# Patient Record
Sex: FEMALE | Race: WHITE
[De-identification: names, ages, dates, MRNs, and addresses within clinical notes are randomized per-mention and may not be internally consistent; named-entity substitution may affect disease eponyms.]

---

## 2017-04-19 ENCOUNTER — HOSPITAL ENCOUNTER (OUTPATIENT)
Dept: HOSPITAL 45 - C.LABBC | Age: 77
Discharge: HOME | End: 2017-04-19
Attending: PHYSICIAN ASSISTANT
Payer: COMMERCIAL

## 2017-04-19 DIAGNOSIS — G50.0: Primary | ICD-10-CM

## 2017-04-19 LAB
BUN SERPL-MCNC: 20 MG/DL (ref 7–18)
CREAT SERPL-MCNC: 0.91 MG/DL (ref 0.6–1.2)

## 2017-04-21 ENCOUNTER — HOSPITAL ENCOUNTER (OUTPATIENT)
Dept: HOSPITAL 45 - C.MRIBC | Age: 77
Discharge: HOME | End: 2017-04-21
Attending: PHYSICIAN ASSISTANT
Payer: COMMERCIAL

## 2017-04-21 DIAGNOSIS — G50.0: Primary | ICD-10-CM

## 2017-04-21 NOTE — DIAGNOSTIC IMAGING REPORT
MRI OF THE BRAIN COMBO TRIGEMINAL NERVE PROTOCOL



CLINICAL HISTORY: Left-sided trigeminal neuralgia.



COMPARISON STUDY: MRI of the brain dated 11/26/2015 and 6/17/2015.



TECHNIQUE: MRI of the brain was performed utilizing various T1 and T2-weighted

sequences in the axial, sagittal, and coronal planes. Contrast-enhanced

sequences were acquired following the administration of 6 cc of Gadavist.

Additional high-resolution imaging was performed through the skull base both pre

and postcontrast for further assessment of the trigeminal nerve.



FINDINGS:



Brain parenchyma: Right frontal encephalomalacia is unchanged and likely related

to previous surgery. Mild dural thickening and enhancement along the right

frontal convexity in the midline falx is similar to previous and likely a

postoperative basis. There are age-related involutional changes noting minimal

subcortical and periventricular PICC radiopathic disease. There is no hemorrhage

or mass effect. There is no restricted diffusion to suggest acute ischemia. No

enhancing mass lesion is identified on the postcontrast images. Gray-white

matter differentiation is preserved. No extra-axial fluid collection is seen.

The cerebellar tonsils are normal in configuration.



Ventricles, sulci, and cisterns: Prominent secondary to involutional change.



Trigeminal nerves: The trigeminal nerves are symmetric bilaterally, and are

normal in both morphology and signal intensity. No abnormal enhancement is

identified involving the trigeminal nerves on the postcontrast imaging.



Pituitary and sella: Unremarkable.



Intracranial vasculature: Normal flow voids are maintained at the skull base.



Orbits: The bony orbits are grossly intact. Orbital contents are normal in

appearance noting a left ocular lens implant.



Sinuses and mastoids: Clear.



Calvarium: Again seen are postoperative changes from right frontoparietal

craniotomy. No destructive calvarial lesion is seen.



Cervical cord: Partially visualized cervical spinal cord is normal in morphology

and signal intensity.





IMPRESSION: 



1. No acute intracranial abnormality.



2. Mild age-related and postoperative change as above. This is similar in

appearance to prior examinations.



3. Unremarkable MRI assessment of the trigeminal nerves.







Electronically signed by:  Kd Avery M.D.

4/21/2017 11:12 AM



Dictated Date/Time:  4/21/2017 11:02 AM

## 2017-07-10 ENCOUNTER — HOSPITAL ENCOUNTER (EMERGENCY)
Dept: HOSPITAL 45 - C.EDB | Age: 77
Discharge: HOME | End: 2017-07-10
Payer: COMMERCIAL

## 2017-07-10 VITALS
BODY MASS INDEX: 20.87 KG/M2 | WEIGHT: 129.85 LBS | WEIGHT: 129.85 LBS | BODY MASS INDEX: 20.87 KG/M2 | HEIGHT: 65.98 IN | HEIGHT: 65.98 IN

## 2017-07-10 VITALS — SYSTOLIC BLOOD PRESSURE: 148 MMHG | HEART RATE: 55 BPM | OXYGEN SATURATION: 97 % | DIASTOLIC BLOOD PRESSURE: 50 MMHG

## 2017-07-10 VITALS — TEMPERATURE: 98.42 F | OXYGEN SATURATION: 97 %

## 2017-07-10 DIAGNOSIS — Z91.010: ICD-10-CM

## 2017-07-10 DIAGNOSIS — K57.92: ICD-10-CM

## 2017-07-10 DIAGNOSIS — Z88.8: ICD-10-CM

## 2017-07-10 DIAGNOSIS — R19.7: ICD-10-CM

## 2017-07-10 DIAGNOSIS — Z96.649: ICD-10-CM

## 2017-07-10 DIAGNOSIS — Z83.79: ICD-10-CM

## 2017-07-10 DIAGNOSIS — R10.33: Primary | ICD-10-CM

## 2017-07-10 DIAGNOSIS — Z90.49: ICD-10-CM

## 2017-07-10 DIAGNOSIS — M16.10: ICD-10-CM

## 2017-07-10 DIAGNOSIS — Z86.018: ICD-10-CM

## 2017-07-10 DIAGNOSIS — R11.2: ICD-10-CM

## 2017-07-10 DIAGNOSIS — Z88.5: ICD-10-CM

## 2017-07-10 DIAGNOSIS — Z79.899: ICD-10-CM

## 2017-07-10 LAB
ALP SERPL-CCNC: 91 U/L (ref 45–117)
ALT SERPL-CCNC: 21 U/L (ref 12–78)
ANION GAP SERPL CALC-SCNC: 11 MMOL/L (ref 3–11)
APPEARANCE UR: CLEAR
AST SERPL-CCNC: 15 U/L (ref 15–37)
BASOPHILS # BLD: 0.02 K/UL (ref 0–0.2)
BASOPHILS NFR BLD: 0.2 %
BILIRUB UR-MCNC: (no result) MG/DL
BUN SERPL-MCNC: 25 MG/DL (ref 7–18)
BUN/CREAT SERPL: 26.9 (ref 10–20)
CALCIUM SERPL-MCNC: 10.5 MG/DL (ref 8.5–10.1)
CHLORIDE SERPL-SCNC: 107 MMOL/L (ref 98–107)
CO2 SERPL-SCNC: 24 MMOL/L (ref 21–32)
COLOR UR: YELLOW
COMPLETE: YES
CREAT CL PREDICTED SERPL C-G-VRATE: 46.6 ML/MIN
CREAT SERPL-MCNC: 0.94 MG/DL (ref 0.6–1.2)
EOSINOPHIL NFR BLD AUTO: 287 K/UL (ref 130–400)
GLUCOSE SERPL-MCNC: 110 MG/DL (ref 70–99)
HCT VFR BLD CALC: 42.3 % (ref 37–47)
IG%: 0.1 %
IMM GRANULOCYTES NFR BLD AUTO: 26.7 %
LYMPHOCYTES # BLD: 2.14 K/UL (ref 1.2–3.4)
MANUAL MICROSCOPIC REQUIRED?: NO
MCH RBC QN AUTO: 35 PG (ref 25–34)
MCHC RBC AUTO-ENTMCNC: 35.2 G/DL (ref 32–36)
MCV RBC AUTO: 99.3 FL (ref 80–100)
MONOCYTES NFR BLD: 9.6 %
NEUTROPHILS # BLD AUTO: 2.2 %
NEUTROPHILS NFR BLD AUTO: 61.2 %
NITRITE UR QL STRIP: (no result)
PH UR STRIP: 6 [PH] (ref 4.5–7.5)
PMV BLD AUTO: 10.8 FL (ref 7.4–10.4)
POTASSIUM SERPL-SCNC: 3.9 MMOL/L (ref 3.5–5.1)
RBC # BLD AUTO: 4.26 M/UL (ref 4.2–5.4)
REVIEW REQ?: NO
SODIUM SERPL-SCNC: 142 MMOL/L (ref 136–145)
SP GR UR STRIP: 1.02 (ref 1–1.03)
URINE EPITHELIAL CELL AUTO: (no result) /LPF (ref 0–5)
UROBILINOGEN UR-MCNC: (no result) MG/DL
WBC # BLD AUTO: 8.01 K/UL (ref 4.8–10.8)
ZZUR CULT IF INDIC CLEAN CATCH: NO

## 2017-07-10 NOTE — EMERGENCY ROOM VISIT NOTE
History


Report prepared by Gosia:  Marysol Timmons


Under the Supervision of:  Dr. Maximiliano Marquez D.O.


First contact with patient:  09:26


Chief Complaint:  NAUSEA


Stated Complaint:  FLU LIKE SX


Nursing Triage Summary:  


Patient having nausea, vomiting, diarrhea and on/off fevers. 











PMH: Trigeminal Neuralgia (right side), Bell's Palsy (left side), S/P Choley, 


diverticulitis.





History of Present Illness


The patient is a 77 year old female who presents to the Emergency Room with 

complaints of worsening abdominal pain beginning last night. She states that 

she is also experiencing nausea, vomiting and diarrhea. She states a couple of 

episodes of diarrhea.The patient notes a decrease in appetite. She has a 

history of diverticulitis. The patient takes Lorazepam and states that she took 

her dosage yesterday and saw improvement in her symptoms. She notes trigeminal 

neuralgia on her right side. Pt denies headache, change in vision, fevers, 

chest pain, shortness of breath, pain with urination, blood in stool, recent 

exposure to illness, weakness or numbness in one extremity and melena.  He 

admits to persistent Bell's palsy over the past 3 years on the left side of her 

face.





   Source of History:  patient


   Onset:  last night


   Position:  abdomen


   Timing:  worsening


   Associated Symptoms:  + nausea, + vomiting, + urinary symptoms





Review of Systems


See HPI for pertinent positives & negatives. A total of 10 systems reviewed and 

were otherwise negative.





Past Medical & Surgical


Medical Problems:


(1) CLAUDIO (acute kidney injury)


(2) Bell's palsy


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


(4) Diverticulitis


(5) Hypotension


(6) Meningioma


(7) Volume depletion


Surgical Problems:


(1) History of hip replacement


(2) S/P cholecystectomy








Family History





Appendicitis


FH: colonic diverticulitis





Social History


Smoking Status:  Never Smoker


Alcohol Use:  occasionally


Drug Use:  none


Marital Status:  


Housing Status:  lives with significant other


Occupation Status:  retired





Current/Historical Medications


Scheduled


Baclofen (Lioresal), 20 MG PO BID


Lorazepam (Ativan), 0.5 MG PO DAILY


Ondansetron Hcl (Zofran), 4 MG PO TID





Allergies


Coded Allergies:  


     Hydrocodone (Verified  Allergy, Intermediate, ITCHING, 7/10/17)


     Codeine (Verified  Allergy, Mild, RASH, 7/10/17)


     Phenytoin (Verified  Allergy, Mild, VERTIGO, 7/10/17)


     Peanut (Verified  Allergy, Unknown, SHORTNESS OF BREATH, 7/10/17)


     Hydromorphone (Verified  Adverse Reaction, Intermediate, NAUSEA/ VOMITING, 

7/10/17)


     Oxycodone (Verified  Adverse Reaction, Mild, NAUSEA/VERTIGO, 7/10/17)





Physical Exam


Vital Signs











  Date Time  Temp Pulse Resp B/P (MAP) Pulse Ox O2 Delivery O2 Flow Rate FiO2


 


7/10/17 11:28  55 18 148/50 97 Room Air  


 


7/10/17 09:26  58      


 


7/10/17 09:22 36.9 77 18 158/88 97 Room Air  


 


7/10/17 09:22     97 Room Air  











Physical Exam


GENERAL: laying in bed, disheveled, alert, well appearing, well nourished, no 

distress, non-toxic 


EYE EXAM: normal conjunctiva, PERRL and EOM's intact


OROPHARYNX: no exudate, no erythema, lips, buccal mucosa, and tongue normal and 

mucous membranes are moist


NECK: supple, no nuchal rigidity, no adenopathy, non-tender


LUNGS: Clear to auscultation. Normal chest wall mechanics


HEART: no murmurs, S1 normal and S2 normal 


ABDOMEN: abdomen soft, non-tender, normo-active bowel sounds, no masses, no 

rebound or guarding. 


BACK: Back is symmetrical on inspection and there is no deformity, no midline 

tenderness, no CVA tenderness. 


SKIN: no rashes and no bruising 


UPPER EXTREMITIES: upper extremities are grossly normal. 


LOWER EXTREMITIES: No pitting edema.


NEURO EXAM: Normal sensorium, cranial nerves II-XII normal with exception of 

left facial droop x3 years, normal speech,  no weakness of arms, no weakness of 

legs.





Medical Decision & Procedures


ER Provider


Diagnostic Interpretation:


CT result as stated below per my review and the radiologist's interpretation: 





ABD/PELVIS IV CONTRAST ONLY





HISTORY:77 yearsFemalelower abd/priumbilical pain  





COMPARISON: CT abdomen and pelvis 11/28/2016 and 1/26/2007.





TECHNIQUE: Multiple axial CT images of the abdomen and pelvis were obtained


following the intravenous administration of 93 mL Optiray 320.





FINDINGS: 


There is minimal dependent bibasilar atelectasis. No gross pneumoperitoneum.


Inferior cardiac chambers are unremarkable.





Circumscribed low attenuating lesion of the posterior right hepatic lobe, 1.5 x


1.3 cm is nonspecific and suggests a hepatic cyst. 1.7 x 1.0 cm low attenuating


lesion of the mid spleen also appears unchanged from multiple comparisons dating


back to 1/26/2007 suggesting benign cyst. Prior cholecystectomy. There is


moderate pancreatic atrophy. Adrenal glands appear unremarkable.





5 mm low attenuating lesion of the interpolar left kidney is too small to


characterize however suggests cyst with similar appearing lesions seen on the


right. No hydronephrosis. The urinary bladder is unremarkable. Uterus and adnexa


are age-appropriate.





There is moderate atherosclerotic plaquing of the abdominal aorta and its


branches. No pathologic adenopathy identified. There is a small to


moderate-sized hiatal hernia with partially intrathoracic stomach. No bowel


obstruction. Noninflamed colonic diverticula are seen. Normal appendix. Note is


made of diastases recti with a small periumbilical fat filled hernia with


diastases of 1.2 cm. Left gluteal injection granulomas present. 





Right hip arthroplasty noted. There is moderate osteoarthritis of the left hip.


7 mm retrolisthesis L2 on L3 and 6 mm retrolisthesis L1 on L2 are again noted


with approximately 30% anterior endplate compression deformity of L1, unchanged


findings.





IMPRESSION: 


1. No acute intra-abdominal or intrapelvic abnormality identified.


2. Diastases recti with small fat filled periumbilical hernia.


3. Colonic diverticulosis without diverticulitis.


4.  Small-to-moderate sized hiatal hernia.


5. Prior cholecystectomy.








The above report was generated using voice recognition software. It may contain


grammatical, syntax or spelling errors.





Electronically signed by:  Mingo Mendez


7/10/2017 11:16 AM





Dictated Date/Time:  7/10/2017 11:05 AM





Laboratory Results


7/10/17 09:25








Red Blood Count 4.26, Mean Corpuscular Volume 99.3, Mean Corpuscular Hemoglobin 

35.0, Mean Corpuscular Hemoglobin Concent 35.2, Mean Platelet Volume 10.8, 

Neutrophils (%) (Auto) 61.2, Lymphocytes (%) (Auto) 26.7, Monocytes (%) (Auto) 

9.6, Eosinophils (%) (Auto) 2.2, Basophils (%) (Auto) 0.2, Neutrophils # (Auto) 

4.89, Lymphocytes # (Auto) 2.14, Monocytes # (Auto) 0.77, Eosinophils # (Auto) 

0.18, Basophils # (Auto) 0.02





7/10/17 09:25

















Test


  7/10/17


09:25 7/10/17


11:25


 


White Blood Count


  8.01 K/uL


(4.8-10.8) 


 


 


Red Blood Count


  4.26 M/uL


(4.2-5.4) 


 


 


Hemoglobin


  14.9 g/dL


(12.0-16.0) 


 


 


Hematocrit 42.3 % (37-47)  


 


Mean Corpuscular Volume


  99.3 fL


() 


 


 


Mean Corpuscular Hemoglobin


  35.0 pg


(25-34) 


 


 


Mean Corpuscular Hemoglobin


Concent 35.2 g/dl


(32-36) 


 


 


Platelet Count


  287 K/uL


(130-400) 


 


 


Mean Platelet Volume


  10.8 fL


(7.4-10.4) 


 


 


Neutrophils (%) (Auto) 61.2 %  


 


Lymphocytes (%) (Auto) 26.7 %  


 


Monocytes (%) (Auto) 9.6 %  


 


Eosinophils (%) (Auto) 2.2 %  


 


Basophils (%) (Auto) 0.2 %  


 


Neutrophils # (Auto)


  4.89 K/uL


(1.4-6.5) 


 


 


Lymphocytes # (Auto)


  2.14 K/uL


(1.2-3.4) 


 


 


Monocytes # (Auto)


  0.77 K/uL


(0.11-0.59) 


 


 


Eosinophils # (Auto)


  0.18 K/uL


(0-0.5) 


 


 


Basophils # (Auto)


  0.02 K/uL


(0-0.2) 


 


 


RDW Standard Deviation


  48.2 fL


(36.4-46.3) 


 


 


RDW Coefficient of Variation


  13.4 %


(11.5-14.5) 


 


 


Immature Granulocyte % (Auto) 0.1 %  


 


Immature Granulocyte # (Auto)


  0.01 K/uL


(0.00-0.02) 


 


 


Anion Gap


  11.0 mmol/L


(3-11) 


 


 


Est Creatinine Clear Calc


Drug Dose 46.6 ml/min 


  


 


 


Estimated GFR (


American) 67.8 


  


 


 


Estimated GFR (Non-


American 58.5 


  


 


 


BUN/Creatinine Ratio 26.9 (10-20)  


 


Calcium Level


  10.5 mg/dl


(8.5-10.1) 


 


 


Total Bilirubin


  0.6 mg/dl


(0.2-1) 


 


 


Direct Bilirubin


  0.2 mg/dl


(0-0.2) 


 


 


Aspartate Amino Transf


(AST/SGOT) 15 U/L (15-37) 


  


 


 


Alanine Aminotransferase


(ALT/SGPT) 21 U/L (12-78) 


  


 


 


Alkaline Phosphatase


  91 U/L


() 


 


 


Total Protein


  7.8 gm/dl


(6.4-8.2) 


 


 


Albumin


  4.5 gm/dl


(3.4-5.0) 


 


 


Lipase


  189 U/L


() 


 


 


Urine Color  YELLOW 


 


Urine Appearance  CLEAR (CLEAR) 


 


Urine pH  6.0 (4.5-7.5) 


 


Urine Specific Gravity


  


  1.019


(1.000-1.030)


 


Urine Protein  NEG (NEG) 


 


Urine Glucose (UA)  NEG (NEG) 


 


Urine Ketones  NEG (NEG) 


 


Urine Occult Blood  NEG (NEG) 


 


Urine Nitrite  NEG (NEG) 


 


Urine Bilirubin  NEG (NEG) 


 


Urine Urobilinogen  NEG (NEG) 


 


Urine Leukocyte Esterase  NEG (NEG) 


 


Urine WBC (Auto)  1-5 /hpf (0-5) 


 


Urine RBC (Auto)  0-4 /hpf (0-4) 


 


Urine Hyaline Casts (Auto)  0 /lpf (0-5) 


 


Urine Epithelial Cells (Auto)  0-5 /lpf (0-5) 


 


Urine Bacteria (Auto)  NEG (NEG) 





Laboratory results per my review.





Medications Administered











 Medications


  (Trade)  Dose


 Ordered  Sig/Nola


 Route  Start Time


 Stop Time Status Last Admin


Dose Admin


 


 Sodium Chloride  1,000 ml @ 


 999 mls/hr  Q1H1M STAT


 IV  7/10/17 09:32


 7/10/17 10:32 DC 7/10/17 09:52


999 MLS/HR


 


 Ondansetron HCl


  (Zofran Inj)  4 mg  NOW  STAT


 IV  7/10/17 09:32


 7/10/17 09:34 DC 7/10/17 09:52


4 MG











ECG


Indication:  abdominal pain


Rate (beats per minute):  66


Rhythm:  sinus rhythm


Findings:  PVC, other (normal axis, normal intervals)





ED Course


ED COURSE: 


Vital signs were reviewed and showed bradycardic. 


The patients medical record was reviewed


The above diagnostic studies were performed and reviewed.


ED treatments and interventions as stated above. 





0929: The patient was evaluated in room B12. A complete history and physical 

examination was performed.





0932: Morphine Sulfate Inj 4 mg IV, Zofran Inj 4 mg IV, Sodium Chloride 1,000 

ml @ 999 mls/hr IV. 





1137: I reevaluated the patient and she is feeling better. 





1155: Upon reevaluation, the patient is hemodynamically stable.I discussed my 

findings with the patient and she understands and agrees with the treatment 

plan.   


Based on the patients age, coexisting illnesses, exam and lab findings the 

decision to treat as an outpatient was made.


The patient remained stable while under my care.


The patient appeared well at the time of discharge.





Medical Decision





Medication Reconciliation: I attest that I have personally reviewed the patient'

s current medication list.





Blood pressure screening: Patient was found to have an elevated blood pressure 

and was referred to their primary doctor for recheck and further treatment.   





Differential diagnoses includes but is not limited to gastritis, peptic ulcer 

disease, GERD, gallbladder disease, pancreatitis, small bowel obstruction, 

acute coronary syndrome, pericarditis, ischemic bowel, irritable bowel disease, 

irritable bowel syndrome, appendicitis, diverticulitis, malignancy, hernia, 

urinary tract infection, perforation, trauma, infectious. 





The patient is a 77 year old female who presents to the ED with complaints of 

abdominal pain.  Her abdominal pain has improved significantly upon arrival.  

She does admit to nausea, vomiting and diarrhea.  No focal deficit.  Labs are 

unremarkable.  No leukocytosis.  BMP along with LFTs, bilirubin and lipase were 

normal.  Calcium was slightly elevated.  UA was negative.  CT of the abdomen 

and pelvis shows no acute pathology.  She was given fluids and Zofran.  She 

felt slightly better.  She was discharged follow-up with her PCP. Discussed 

with Pt concerning signs and symptoms to watch out for. Pt was instructed to 

follow up with their PCP and discussed with the patient their option to return 

to the ED at anytime for persistent or worsening symptoms. The appropriate 

anticipatory guidance and out-patient management, including indications for 

return to the emergency department, were explained at length to the patient and 

understood.





Impression





 Primary Impression:  


 Periumbilical abdominal pain





Scribe Attestation


The scribe's documentation has been prepared under my direction and personally 

reviewed by me in its entirety. I confirm that the note above accurately 

reflects all work, treatment, procedures, and medical decision making performed 

by me.





Departure Information


Dispostion


Home / Self-Care





Prescriptions





Ondansetron Hcl (ZOFRAN) 4 Mg Tab


4 MG PO TID for Nausea or Vomiting for 5 Days, #15 TAB


   Prov: Maximiliano Marquez, DO         7/10/17





Referrals


Fady Rubio M.D. (PCP)





Forms


HOME CARE DOCUMENTATION FORM,                                                 

               IMPORTANT VISIT INFORMATION





Patient Instructions


Abdominal Pain - [facility]





Additional Instructions





Please follow up with your primary care doctor with in the next 24 hours.  Any 

worsening of your symptoms, please return to the ED immediately.  This includes 

fevers greater than 100.4, inability to drink, worsening pain, or any other 

concerning signs or symptoms from your standpoint.

## 2017-07-10 NOTE — DIAGNOSTIC IMAGING REPORT
ABD/PELVIS IV CONTRAST ONLY



HISTORY:77 yearsFemalelower abd/priumbilical pain  



COMPARISON: CT abdomen and pelvis 11/28/2016 and 1/26/2007.



TECHNIQUE: Multiple axial CT images of the abdomen and pelvis were obtained

following the intravenous administration of 93 mL Optiray 320.



FINDINGS: 

There is minimal dependent bibasilar atelectasis. No gross pneumoperitoneum.

Inferior cardiac chambers are unremarkable.



Circumscribed low attenuating lesion of the posterior right hepatic lobe, 1.5 x

1.3 cm is nonspecific and suggests a hepatic cyst. 1.7 x 1.0 cm low attenuating

lesion of the mid spleen also appears unchanged from multiple comparisons dating

back to 1/26/2007 suggesting benign cyst. Prior cholecystectomy. There is

moderate pancreatic atrophy. Adrenal glands appear unremarkable.



5 mm low attenuating lesion of the interpolar left kidney is too small to

characterize however suggests cyst with similar appearing lesions seen on the

right. No hydronephrosis. The urinary bladder is unremarkable. Uterus and adnexa

are age-appropriate.



There is moderate atherosclerotic plaquing of the abdominal aorta and its

branches. No pathologic adenopathy identified. There is a small to

moderate-sized hiatal hernia with partially intrathoracic stomach. No bowel

obstruction. Noninflamed colonic diverticula are seen. Normal appendix. Note is

made of diastases recti with a small periumbilical fat filled hernia with

diastases of 1.2 cm. Left gluteal injection granulomas present. 



Right hip arthroplasty noted. There is moderate osteoarthritis of the left hip.

7 mm retrolisthesis L2 on L3 and 6 mm retrolisthesis L1 on L2 are again noted

with approximately 30% anterior endplate compression deformity of L1, unchanged

findings.



IMPRESSION: 

1. No acute intra-abdominal or intrapelvic abnormality identified.

2. Diastases recti with small fat filled periumbilical hernia.

3. Colonic diverticulosis without diverticulitis.

4.  Small-to-moderate sized hiatal hernia.

5. Prior cholecystectomy.





The above report was generated using voice recognition software. It may contain

grammatical, syntax or spelling errors.







Electronically signed by:  Mingo Mendez

7/10/2017 11:16 AM



Dictated Date/Time:  7/10/2017 11:05 AM

## 2017-08-04 ENCOUNTER — HOSPITAL ENCOUNTER (EMERGENCY)
Dept: HOSPITAL 45 - C.EDB | Age: 77
Discharge: HOME | End: 2017-08-04
Payer: COMMERCIAL

## 2017-08-04 VITALS — TEMPERATURE: 97.88 F

## 2017-08-04 VITALS
BODY MASS INDEX: 21.9 KG/M2 | BODY MASS INDEX: 21.9 KG/M2 | WEIGHT: 136.25 LBS | HEIGHT: 65.98 IN | HEIGHT: 65.98 IN | WEIGHT: 136.25 LBS

## 2017-08-04 VITALS — SYSTOLIC BLOOD PRESSURE: 112 MMHG | DIASTOLIC BLOOD PRESSURE: 74 MMHG | HEART RATE: 104 BPM | OXYGEN SATURATION: 96 %

## 2017-08-04 DIAGNOSIS — S30.0XXA: ICD-10-CM

## 2017-08-04 DIAGNOSIS — I10: ICD-10-CM

## 2017-08-04 DIAGNOSIS — M16.10: ICD-10-CM

## 2017-08-04 DIAGNOSIS — G51.0: ICD-10-CM

## 2017-08-04 DIAGNOSIS — C70.9: ICD-10-CM
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
## 2017-08-04 DIAGNOSIS — N17.9: ICD-10-CM

## 2017-08-04 DIAGNOSIS — K57.92: ICD-10-CM

## 2017-08-04 DIAGNOSIS — S01.01XA: Primary | ICD-10-CM

## 2017-08-04 NOTE — EMERGENCY ROOM VISIT NOTE
History


Report prepared by Gosia:  Brennan Lipscomb


Under the Supervision of:  Dr. Agnieszka Finney D.O.


First contact with patient:  05:52


Chief Complaint:  LACERATION/CUT (SUT/DERMABOND)


Stated Complaint:  FELL AND HIT HEAD ON DRESSER





History of Present Illness


The patient is a 77 year old female who presents to the Emergency Room with 

complaints of  a sudden laceration to the left side of her head that occurred 

around 0515. She rates her pain as a 5/10 in severity. The patient states that 

she woke up and went to go to the bathroom when she suddenly experienced an 

episode of incontinence. She states that she slipped in her own urine on the 

ground, causing her to fall on her buttocks and hit her head on the sharp 

corner of her dresser. She is accompanied by her  who states that her 

head was bleeding severely directly after the incident, but admits that it had 

resolved shortly after. The patient states that she started to experience pain 

in her coccyx region and foggy vision on her left side following the fall. He 

states that he gave her two Tylenol to alleviate her pain. Her  also 

states that she typically takes Aspirin whenever she experiences a headache. 

The patient admits that she has a history of Bell's Palsy for three years and a 

head surgery to remove a meningioma in 2006. Her  states that she has an 

allergy to Percocet. The patient denies any blood thinners and syncope.





   Source of History:  patient


   Onset:  0515


   Position:  head


   Symptom Intensity:  5/10


   Timing:  other (sudden)


   Modifying Factors (Relieving):  other (tylenol)


   Associated Symptoms:  + back pain, No LOC





Review of Systems


See HPI for pertinent positives & negatives. A total of 10 systems reviewed and 

were otherwise negative.





Past Medical & Surgical


Medical Problems:


(1) CLAUDIO (acute kidney injury)


(2) Bell's palsy


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


(4) Diverticulitis


(5) Hypotension


(6) Meningioma


(7) Volume depletion


Surgical Problems:


(1) History of hip replacement


(2) S/P cholecystectomy








Family History





Appendicitis


FH: colonic diverticulitis





Social History


Smoking Status:  Never Smoker


Alcohol Use:  occasionally


Drug Use:  none


Marital Status:  


Housing Status:  lives with significant other


Occupation Status:  retired





Current/Historical Medications


Scheduled


Baclofen (Lioresal), 20 MG PO BID


Lorazepam (Ativan), 0.5 MG PO DAILY





Allergies


Coded Allergies:  


     Hydrocodone (Verified  Allergy, Intermediate, ITCHING, 8/4/17)


     Codeine (Verified  Allergy, Mild, RASH, 8/4/17)


     Phenytoin (Verified  Allergy, Mild, VERTIGO, 8/4/17)


     Peanut (Verified  Allergy, Unknown, SHORTNESS OF BREATH, 8/4/17)


     Hydromorphone (Verified  Adverse Reaction, Intermediate, NAUSEA/ VOMITING, 

8/4/17)


     Oxycodone (Verified  Adverse Reaction, Mild, NAUSEA/VERTIGO, 8/4/17)





Physical Exam


Vital Signs











  Date Time  Temp Pulse Resp B/P (MAP) Pulse Ox O2 Delivery O2 Flow Rate FiO2


 


8/4/17 05:48 36.6 104 20 112/74 96 Room Air  











Physical Exam


HEENT: Head - 3.5 cm laceration to left occipital region. normocephalic. Pupils 

are equal, round, and reactive to light. Extraocular eye muscles are intact and 

sclera are anicteric.  Ears -  bilaterally patent canals with no evidence of 

hemotympanum.  Nose -  moist nasal mucosa without evidence of trauma or 

discharge. Mouth - moist buccal mucosa with no trauma to the teeth or signs of 

malocclusion.


Neck:   The neck is supple and there is no pain to palpation over the posterior 

cervical spine and no obvious step-offs or deformities.  There is no JVD or 

tracheal deviation. No pain upon palpation to Cervical spine.


Chest: There are no signs of deformities, contusions or abrasions to the chest 

wall.  There is no obvious crepitus or paradoxical chest rise.


Heart: Regular, rate, and rhythm.  There is a normal S1 and S2 with no murmurs, 

clicks, or gallops appreciated.


Lungs: Clear to auscultation bilaterally with no wheezes, rales, or rhonchi.


Abdomen: Soft, completely nontender, nondistended, with good bowel sounds.  

There is no sign of trauma such as contusions, abrasions or penetrations.  

There are no palpable pulsatile masses or hepatosplenomegaly.  There is no 

guarding, rigidity, or rebound noted.


Pelvis: Stable to rock and compression.


Extremities: No obvious trauma, deformities, contusions, or edema.  There are 

easily palpable peripheral pulses.


Neuro: The patient is awake and alert and easily able to follow commands.  

Muscle strength is 5 out of 5 in all 4 extremities.  Otherwise, neuro exam is 

unremarkable.


Back: The entire thoracic, lumbar, and sacral spine were palpated.  There are 

no obvious step-offs or deformities noted.  There are no obvious signs of 

trauma such as contusions abrasions penetrations noted to the back. Tenderness 

over coccyx region upon palpation.  No obvious crepitus.





Medical Decision & Procedures


Procedure


Location: Left occipital region





Total length: 3.5 cm





Complexity: Simple





Verbal consent was obtained. A time out was taken and the correct patient and 

site identified. The skin was prepped with betadine. The target area was 

anesthetized with 4 ml of 1% lidocaine without epinephrine. Copious irrigation 

was performed using Sterile water. The skin was re-prepped with betadine and a 

sterile field set. The wound was explored for foreign bodies and none found. 

Examination revealed no injury to deep structures such as tendons, bone, or 

significant blood vessels. Debridement was not performed. The wound edges were 

approximated using 10 staples. Hemostasis and excellent approximation was 

achieved. Antibacterial ointment and a sterile dressing applied. Detailed wound 

care instructions and signs and symptoms of infection reviewed with the 

. No complications and the patient tolerated the procedure well.





ED Course


0555: Past medical records reviewed. The patient was evaluated in room B10. A 

complete history and physical exam was performed.





0603: Lidocaine HCl 20 ml INFIL.





0605: I performed a laceration repair to the left occipital region. See 

procedure note for further details.





Medical Decision


The patient is a 77 year old female who presents to the ED with complaints of a 

sudden laceration to the left side of her head that occurred around 0515 this 

morning when she slipped and fell. Differential diagnosis includes skull 

fracture, intracranial hemorrhage, scalp laceration, closed head injury, C 

spine injury.





The patient slipped in urine while trying to walk to the bathroom.  She fell 

straight backwards striking the left side of her head on a dresser and landing 

on her tailbone/coccyx.  She did not lose consciousness.  She does not take a 

blood thinner other than aspirin.  Her mental status is at its baseline 

according to her .  The wound on the scalp was repaired without any 

difficulty.  She had no pain to palpation over her cervical spine.  She did 

have some slight discomfort over the coccyx with palpation but no obvious 

crepitus.  The  had administered oral Motrin and the patient stated that 

she was feeling better.  





The patient was instructed to keep the wound clean with soap and moderate apply 

antibiotic ointment.  She is to follow-up with her PCP in 10 days to have the 

staples removed.





Medication Reconcilliation


Current Medication List:  was personally reviewed by me





Blood Pressure Screening


Patient's blood pressure:  Normal blood pressure





Impression





 Primary Impression:  


 Laceration of scalp


 Additional Impressions:  


 Fall


 Coccyx contusion





Scribe Attestation


The scribe's documentation has been prepared under my direction and personally 

reviewed by me in its entirety. I confirm that the note above accurately 

reflects all work, treatment, procedures, and medical decision making performed 

by me.





Departure Information


Dispostion


Home / Self-Care





Referrals


Fady Rubio M.D. (PCP)





Forms


HOME CARE DOCUMENTATION FORM,                                                 

               IMPORTANT VISIT INFORMATION





Patient Instructions


ED Laceration Scalp Stitch Or Stap, Falls Prevent Home, Falls Preventing, Falls 

Risks Prevent, My [facility]





Additional Instructions





Rest with your head elevated.





Apply ice to the wound. 





Keep the wound clean with soap and water.  Cover with antibiotic ointment





Have the staples removed in 10 days





Sit on a cushion for tailbone pain





Take  motrin for pain





Problem Qualifiers








 Primary Impression:  


 Laceration of scalp


 Encounter type:  initial encounter  Qualified Codes:  S01.01XA - Laceration 

without foreign body of scalp, initial encounter


 Additional Impressions:  


 Fall


 Encounter type:  initial encounter  Qualified Codes:  W19.XXXA - Unspecified 

fall, initial encounter


 Coccyx contusion


 Encounter type:  initial encounter  Qualified Codes:  S30.0XXA - Contusion of 

lower back and pelvis, initial encounter

## 2017-08-14 ENCOUNTER — HOSPITAL ENCOUNTER (EMERGENCY)
Dept: HOSPITAL 45 - C.EDB | Age: 77
Discharge: HOME | End: 2017-08-14
Payer: COMMERCIAL

## 2017-08-14 VITALS
SYSTOLIC BLOOD PRESSURE: 102 MMHG | OXYGEN SATURATION: 97 % | TEMPERATURE: 98.24 F | HEART RATE: 71 BPM | DIASTOLIC BLOOD PRESSURE: 68 MMHG

## 2017-08-14 VITALS
HEIGHT: 65.98 IN | BODY MASS INDEX: 21.79 KG/M2 | BODY MASS INDEX: 21.79 KG/M2 | WEIGHT: 135.58 LBS | WEIGHT: 135.58 LBS | HEIGHT: 65.98 IN

## 2017-08-14 DIAGNOSIS — Z90.49: ICD-10-CM

## 2017-08-14 DIAGNOSIS — Z48.02: Primary | ICD-10-CM

## 2017-08-14 DIAGNOSIS — Z96.649: ICD-10-CM

## 2017-08-14 NOTE — EMERGENCY ROOM VISIT NOTE
ED Visit Note


First contact with patient:  13:23


CHIEF COMPLAINT:  "Staple removal"





This patient returns to the ED today for removal of sutures that were placed 10 

days ago.  There has been no swelling, redness, or drainage from the wound.  

The patient feels like the laceration is healing well.  





REVIEW OF SYSTEMS:   Head:  No headache, injury or neck pain.  Skin:  No rash, 

new lesions, or masses.  General:  No fever or chills, fatigue, loss of appetite

, or significant recent weight gain or loss.





PMH:  The patient is healthy; there is no significant medical or surgical 

history.





SOCIAL HISTORY:  Patient lives at home.    





PHYSICAL EXAM: Vital Signs: Reviewed Nurse's notes.  There is a sutured wound 

on the scalp with no signs of infection.  There is no erythema, swelling, or 

tenderness. 





EMERGENCY DEPARTMENT COURSE: The sutures were removed without any difficulty 

and there was no separation of the wound edges.    








Problem List


Medical Problems:


(1) Bell's palsy


Status: Chronic  





(2) Degenerative joint disease (DJD) of hip


Status: Chronic  





(3) Diverticulitis


Status: Resolved  





(4) Hypotension


Status: Chronic  





(5) Meningioma


Status: Resolved  





Surgical Problems:


(1) History of hip replacement


Status: Resolved  





(2) S/P cholecystectomy


Status: Resolved  











Current/Historical Medications


Scheduled


Baclofen (Lioresal), 20 MG PO BID


Lorazepam (Ativan), 0.5 MG PO DAILY





Allergies


Coded Allergies:  


     Hydrocodone (Verified  Allergy, Intermediate, ITCHING, 8/4/17)


     Codeine (Verified  Allergy, Mild, RASH, 8/4/17)


     Phenytoin (Verified  Allergy, Mild, VERTIGO, 8/4/17)


     Peanut (Verified  Allergy, Unknown, SHORTNESS OF BREATH, 8/4/17)


     Hydromorphone (Verified  Adverse Reaction, Intermediate, NAUSEA/ VOMITING, 

8/4/17)


     Oxycodone (Verified  Adverse Reaction, Mild, NAUSEA/VERTIGO, 8/4/17)





Vital Signs











  Date Time  Temp Pulse Resp B/P (MAP) Pulse Ox O2 Delivery O2 Flow Rate FiO2


 


8/14/17 13:07 36.8 71 20 102/68 97 Room Air  











Departure Information


Impression





 Primary Impression:  


 Encounter for removal of staples





Dispostion


Home / Self-Care





Condition


GOOD





Referrals


No Doctor, Assigned (PCP)





Patient Instructions


[facility]





Additional Instructions





DISCHARGE INSTRUCTIONS AND TREATMENT:  Wash any remaining crusts off of the 

wound today and resume your normal activities.

## 2018-04-19 ENCOUNTER — HOSPITAL ENCOUNTER (OUTPATIENT)
Dept: HOSPITAL 45 - C.LABBC | Age: 78
Discharge: HOME | End: 2018-04-19
Attending: INTERNAL MEDICINE
Payer: COMMERCIAL

## 2018-04-19 DIAGNOSIS — R53.83: Primary | ICD-10-CM

## 2018-04-19 DIAGNOSIS — E78.5: ICD-10-CM

## 2018-04-19 DIAGNOSIS — E83.42: ICD-10-CM

## 2018-04-19 LAB
ALBUMIN SERPL-MCNC: 3.7 GM/DL (ref 3.4–5)
ALP SERPL-CCNC: 82 U/L (ref 45–117)
ALT SERPL-CCNC: 24 U/L (ref 12–78)
AST SERPL-CCNC: 18 U/L (ref 15–37)
BASOPHILS # BLD: 0.02 K/UL (ref 0–0.2)
BASOPHILS NFR BLD: 0.3 %
BUN SERPL-MCNC: 23 MG/DL (ref 7–18)
CALCIUM SERPL-MCNC: 9.3 MG/DL (ref 8.5–10.1)
CO2 SERPL-SCNC: 28 MMOL/L (ref 21–32)
CREAT SERPL-MCNC: 0.98 MG/DL (ref 0.6–1.2)
EOS ABS #: 0.2 K/UL (ref 0–0.5)
EOSINOPHIL NFR BLD AUTO: 278 K/UL (ref 130–400)
GLUCOSE SERPL-MCNC: 108 MG/DL (ref 70–99)
HCT VFR BLD CALC: 41.6 % (ref 37–47)
HGB BLD-MCNC: 14.4 G/DL (ref 12–16)
IG#: 0.02 K/UL (ref 0–0.02)
IMM GRANULOCYTES NFR BLD AUTO: 30.2 %
KETONES UR QL STRIP: 93 MG/DL
LYMPHOCYTES # BLD: 1.98 K/UL (ref 1.2–3.4)
MCH RBC QN AUTO: 35.4 PG (ref 25–34)
MCHC RBC AUTO-ENTMCNC: 34.6 G/DL (ref 32–36)
MCV RBC AUTO: 102.2 FL (ref 80–100)
MONO ABS #: 0.7 K/UL (ref 0.11–0.59)
MONOCYTES NFR BLD: 10.7 %
NEUT ABS #: 3.64 K/UL (ref 1.4–6.5)
NEUTROPHILS # BLD AUTO: 3 %
NEUTROPHILS NFR BLD AUTO: 55.5 %
PH UR: 180 MG/DL (ref 0–200)
PMV BLD AUTO: 11.2 FL (ref 7.4–10.4)
POTASSIUM SERPL-SCNC: 3.4 MMOL/L (ref 3.5–5.1)
PROT SERPL-MCNC: 7.5 GM/DL (ref 6.4–8.2)
RED CELL DISTRIBUTION WIDTH CV: 13.8 % (ref 11.5–14.5)
RED CELL DISTRIBUTION WIDTH SD: 51.6 FL (ref 36.4–46.3)
SODIUM SERPL-SCNC: 141 MMOL/L (ref 136–145)
WBC # BLD AUTO: 6.56 K/UL (ref 4.8–10.8)